# Patient Record
Sex: FEMALE | Race: WHITE | NOT HISPANIC OR LATINO | Employment: OTHER | URBAN - METROPOLITAN AREA
[De-identification: names, ages, dates, MRNs, and addresses within clinical notes are randomized per-mention and may not be internally consistent; named-entity substitution may affect disease eponyms.]

---

## 2023-04-07 NOTE — PROGRESS NOTES
New Left Knee      Patient: Wisam Hurst        YOB: 1940    Medical Record Number: [id number]        Chief Complaints: Left knee pain      History of Present Illness: This is a 82-year-old female presents complaining of left knee pain has been ongoing for about 3 months no history injury change in activity no swelling no night pain she walks 3 miles about every other day her pain is primarily medial symptoms are moderate intermittent aching worse with activity somewhat better with rest her past medical history is marked for reflux      Allergies:   Allergies   Allergen Reactions   • Morphine Other (See Comments)     NAUSEA AND VOMITING       Medications:   Home Medications:  Current Outpatient Medications on File Prior to Visit   Medication Sig   • atorvastatin (LIPITOR) 10 MG tablet Take 1 tablet by mouth Daily.   • Multiple Vitamin (multivitamin) capsule Take 1 capsule by mouth Daily.   • Omega-3 Fatty Acids (fish oil) 1000 MG capsule capsule Take 2 capsules by mouth Daily.     No current facility-administered medications on file prior to visit.     Current Medications:  Scheduled Meds:  Continuous Infusions:No current facility-administered medications for this visit.    PRN Meds:.    Past Medical History:   Diagnosis Date   • Arthritis of back 2003   • Arthritis of neck 2000   • CTS (carpal tunnel syndrome) 2003   • Knee swelling 2021   • Low back strain 2000   • Neck strain 2000        Past Surgical History:   Procedure Laterality Date   • HAND SURGERY  2003 - 2004   • WRIST SURGERY  [id number]        Social History     Occupational History   • Not on file   Tobacco Use   • Smoking status: Never   • Smokeless tobacco: Not on file   Vaping Use   • Vaping Use: Never used   Substance and Sexual Activity   • Alcohol use: Not Currently     Alcohol/week: 1.0 standard drink     Types: 1 Glasses of wine per week   • Drug use: Never   • Sexual activity: Not Currently     Partners: Male     Birth  "control/protection: Hysterectomy      Social History     Social History Narrative   • Not on file        Family History   Problem Relation Age of Onset   • Osteoporosis Paternal Aunt              Review of Systems:     Review of Systems      Physical Exam: 82 y.o. female  General Appearance:    Alert, cooperative, in no acute distress                   Vitals:    04/11/23 1316   Temp: 97.7 °F (36.5 °C)   Weight: 60.1 kg (132 lb 8 oz)   Height: 149.9 cm (59\")   PainSc:   2      Patient is alert and read ×3 no acute distress appears her above-listed at height weight and age.  Affect is normal respiratory rate is normal unlabored. Heart rate regular rate rhythm, sclera, dentition and hearing are normal for the purpose of this exam.        Ortho Exam  Physical exam of the left knee reveals no effusion, no erythema.  It mild loss of extension and full flexion  Patient has mild varus alignment.  They have mild tenderness to palpation about the medial compartment, no tenderness laterally..  The patient has a negative bounce home, negative Carmelo and a stable ligamentous exam.  Quad tone is reasonable and symmetric.  There are no overlying skin changes no lymphedema no lymphadenopathy.  There is good hip range of motion which is full symmetric and asymptomatic and a normal ankle exam.      Large Joint Arthrocentesis: L knee  Date/Time: 4/11/2023 1:37 PM  Consent given by: patient  Site marked: site marked  Timeout: Immediately prior to procedure a time out was called to verify the correct patient, procedure, equipment, support staff and site/side marked as required   Supporting Documentation  Indications: pain   Procedure Details  Location: knee - L knee  Preparation: Patient was prepped and draped in the usual sterile fashion  Needle gauge: 21G.  Medications administered: 80 mg methylPREDNISolone acetate 80 MG/ML; 2 mL lidocaine PF 1% 1 %  Patient tolerance: patient tolerated the procedure well with no immediate " complications                   Radiology:   AP, Lateral and merchant views of the left knee  were ordered/reviewed to evauateknee pain.  I have no comparative films she has significant medial compartment narrowing with some varus alignment still some mild joint space remaining  Imaging Results (Most Recent)     Procedure Component Value Units Date/Time    XR Knee 3 View Left [[id number]] Resulted: 04/11/23 1259     Updated: 04/11/23 1301    Impression:      Ordering physician's impression is located in the Encounter Note dated 04/11/23. X-ray performed in the DR room.          Assessment/Plan:  Left knee pain I think this is degenerative in origin plan is to proceed with an injection as a diagnostic and therapeutic tool we talked about the importance of quad and core strengthening weight management I will have her back off her activity a little bit and see if we can get this calm down and then she can slowly increase her walking activity may be combined some biking swimming elliptical with a walking

## 2023-04-11 ENCOUNTER — OFFICE VISIT (OUTPATIENT)
Dept: ORTHOPEDIC SURGERY | Facility: CLINIC | Age: 83
End: 2023-04-11
Payer: MEDICARE

## 2023-04-11 VITALS — HEIGHT: 59 IN | WEIGHT: 132.5 LBS | BODY MASS INDEX: 26.71 KG/M2 | TEMPERATURE: 97.7 F

## 2023-04-11 DIAGNOSIS — R52 PAIN: Primary | ICD-10-CM

## 2023-04-11 DIAGNOSIS — M17.12 PRIMARY OSTEOARTHRITIS OF LEFT KNEE: ICD-10-CM

## 2023-04-11 PROCEDURE — 73562 X-RAY EXAM OF KNEE 3: CPT | Performed by: ORTHOPAEDIC SURGERY

## 2023-04-11 PROCEDURE — 1160F RVW MEDS BY RX/DR IN RCRD: CPT | Performed by: ORTHOPAEDIC SURGERY

## 2023-04-11 PROCEDURE — 20610 DRAIN/INJ JOINT/BURSA W/O US: CPT | Performed by: ORTHOPAEDIC SURGERY

## 2023-04-11 PROCEDURE — 99203 OFFICE O/P NEW LOW 30 MIN: CPT | Performed by: ORTHOPAEDIC SURGERY

## 2023-04-11 PROCEDURE — 1159F MED LIST DOCD IN RCRD: CPT | Performed by: ORTHOPAEDIC SURGERY

## 2023-04-11 RX ORDER — MULTIVITAMIN
1 CAPSULE ORAL DAILY
COMMUNITY

## 2023-04-11 RX ORDER — CHLORAL HYDRATE 500 MG
2 CAPSULE ORAL DAILY
COMMUNITY

## 2023-04-11 RX ORDER — METHYLPREDNISOLONE ACETATE 80 MG/ML
80 INJECTION, SUSPENSION INTRA-ARTICULAR; INTRALESIONAL; INTRAMUSCULAR; SOFT TISSUE
Status: COMPLETED | OUTPATIENT
Start: 2023-04-11 | End: 2023-04-11

## 2023-04-11 RX ORDER — LIDOCAINE HYDROCHLORIDE 10 MG/ML
2 INJECTION, SOLUTION EPIDURAL; INFILTRATION; INTRACAUDAL; PERINEURAL
Status: COMPLETED | OUTPATIENT
Start: 2023-04-11 | End: 2023-04-11

## 2023-04-11 RX ORDER — ATORVASTATIN CALCIUM 10 MG/1
10 TABLET, FILM COATED ORAL DAILY
Qty: 30 TABLET | Refills: 11 | COMMUNITY
Start: 2022-12-29 | End: 2023-12-29

## 2023-04-11 RX ADMIN — METHYLPREDNISOLONE ACETATE 80 MG: 80 INJECTION, SUSPENSION INTRA-ARTICULAR; INTRALESIONAL; INTRAMUSCULAR; SOFT TISSUE at 13:37

## 2023-04-11 RX ADMIN — LIDOCAINE HYDROCHLORIDE 2 ML: 10 INJECTION, SOLUTION EPIDURAL; INFILTRATION; INTRACAUDAL; PERINEURAL at 13:37

## 2023-04-13 ENCOUNTER — PATIENT ROUNDING (BHMG ONLY) (OUTPATIENT)
Dept: ORTHOPEDIC SURGERY | Facility: CLINIC | Age: 83
End: 2023-04-13
Payer: COMMERCIAL